# Patient Record
Sex: MALE | Race: WHITE | Employment: PART TIME | URBAN - METROPOLITAN AREA
[De-identification: names, ages, dates, MRNs, and addresses within clinical notes are randomized per-mention and may not be internally consistent; named-entity substitution may affect disease eponyms.]

---

## 2018-04-03 ENCOUNTER — ANESTHESIA EVENT (OUTPATIENT)
Dept: SURGERY | Age: 78
End: 2018-04-03
Payer: MEDICARE

## 2018-04-04 ENCOUNTER — APPOINTMENT (OUTPATIENT)
Dept: GENERAL RADIOLOGY | Age: 78
End: 2018-04-04
Attending: UROLOGY
Payer: MEDICARE

## 2018-04-04 ENCOUNTER — ANESTHESIA (OUTPATIENT)
Dept: SURGERY | Age: 78
End: 2018-04-04
Payer: MEDICARE

## 2018-04-04 ENCOUNTER — HOSPITAL ENCOUNTER (OUTPATIENT)
Age: 78
Setting detail: OUTPATIENT SURGERY
Discharge: HOME | End: 2018-04-04
Attending: UROLOGY | Admitting: UROLOGY
Payer: MEDICARE

## 2018-04-04 VITALS
SYSTOLIC BLOOD PRESSURE: 116 MMHG | RESPIRATION RATE: 16 BRPM | HEIGHT: 68 IN | HEART RATE: 60 BPM | TEMPERATURE: 97.1 F | DIASTOLIC BLOOD PRESSURE: 70 MMHG | WEIGHT: 183.31 LBS | OXYGEN SATURATION: 92 % | BODY MASS INDEX: 27.78 KG/M2

## 2018-04-04 DIAGNOSIS — C66.2 URETERAL CARCINOMA, LEFT (HCC): Primary | ICD-10-CM

## 2018-04-04 LAB
BUN BLD-MCNC: 33 MG/DL (ref 7–18)
CHLORIDE BLD-SCNC: 102 MMOL/L (ref 100–108)
GLUCOSE BLD STRIP.AUTO-MCNC: 140 MG/DL (ref 70–110)
GLUCOSE BLD STRIP.AUTO-MCNC: 158 MG/DL (ref 74–106)
HCT VFR BLD CALC: 36 % (ref 36–49)
HGB BLD-MCNC: 12.2 G/DL (ref 12–16)
POTASSIUM BLD-SCNC: 4.7 MMOL/L (ref 3.5–5.5)
SODIUM BLD-SCNC: 139 MMOL/L (ref 136–145)

## 2018-04-04 PROCEDURE — 76210000021 HC REC RM PH II 0.5 TO 1 HR: Performed by: UROLOGY

## 2018-04-04 PROCEDURE — 76010000172 HC OR TIME 2.5 TO 3 HR INTENSV-TIER 1: Performed by: UROLOGY

## 2018-04-04 PROCEDURE — 82947 ASSAY GLUCOSE BLOOD QUANT: CPT

## 2018-04-04 PROCEDURE — 74011250637 HC RX REV CODE- 250/637: Performed by: NURSE ANESTHETIST, CERTIFIED REGISTERED

## 2018-04-04 PROCEDURE — 74011250636 HC RX REV CODE- 250/636: Performed by: UROLOGY

## 2018-04-04 PROCEDURE — C1769 GUIDE WIRE: HCPCS | Performed by: UROLOGY

## 2018-04-04 PROCEDURE — 77030032490 HC SLV COMPR SCD KNE COVD -B: Performed by: UROLOGY

## 2018-04-04 PROCEDURE — C1894 INTRO/SHEATH, NON-LASER: HCPCS | Performed by: UROLOGY

## 2018-04-04 PROCEDURE — 88305 TISSUE EXAM BY PATHOLOGIST: CPT | Performed by: UROLOGY

## 2018-04-04 PROCEDURE — C2617 STENT, NON-COR, TEM W/O DEL: HCPCS | Performed by: UROLOGY

## 2018-04-04 PROCEDURE — 74011000250 HC RX REV CODE- 250

## 2018-04-04 PROCEDURE — 82962 GLUCOSE BLOOD TEST: CPT

## 2018-04-04 PROCEDURE — 74011250636 HC RX REV CODE- 250/636: Performed by: NURSE ANESTHETIST, CERTIFIED REGISTERED

## 2018-04-04 PROCEDURE — 74011250636 HC RX REV CODE- 250/636

## 2018-04-04 PROCEDURE — 74011000258 HC RX REV CODE- 258

## 2018-04-04 PROCEDURE — 74420 UROGRAPHY RTRGR +-KUB: CPT

## 2018-04-04 PROCEDURE — C1758 CATHETER, URETERAL: HCPCS | Performed by: UROLOGY

## 2018-04-04 PROCEDURE — 77030025764 HC FCPS BIOP ENDOSC BSC -D: Performed by: UROLOGY

## 2018-04-04 PROCEDURE — 93005 ELECTROCARDIOGRAM TRACING: CPT

## 2018-04-04 PROCEDURE — 77030018823 HC SLV COMPR VENO -B: Performed by: UROLOGY

## 2018-04-04 PROCEDURE — 77030005515 HC CATH URETH FOL14 BARD -B: Performed by: UROLOGY

## 2018-04-04 PROCEDURE — 77030018836 HC SOL IRR NACL ICUM -A: Performed by: UROLOGY

## 2018-04-04 PROCEDURE — 88112 CYTOPATH CELL ENHANCE TECH: CPT | Performed by: UROLOGY

## 2018-04-04 PROCEDURE — 77030013079 HC BLNKT BAIR HGGR 3M -A: Performed by: ANESTHESIOLOGY

## 2018-04-04 PROCEDURE — 74011636320 HC RX REV CODE- 636/320: Performed by: UROLOGY

## 2018-04-04 PROCEDURE — 76210000006 HC OR PH I REC 0.5 TO 1 HR: Performed by: UROLOGY

## 2018-04-04 PROCEDURE — 77030018846 HC SOL IRR STRL H20 ICUM -A: Performed by: UROLOGY

## 2018-04-04 PROCEDURE — 77030008683 HC TU ET CUF COVD -A: Performed by: ANESTHESIOLOGY

## 2018-04-04 PROCEDURE — 77030006974 HC BSKT URET RTVR BSC -C: Performed by: UROLOGY

## 2018-04-04 PROCEDURE — 74011636637 HC RX REV CODE- 636/637: Performed by: NURSE ANESTHETIST, CERTIFIED REGISTERED

## 2018-04-04 PROCEDURE — 76060000036 HC ANESTHESIA 2.5 TO 3 HR: Performed by: UROLOGY

## 2018-04-04 DEVICE — URETERAL STENT
Type: IMPLANTABLE DEVICE | Site: URETER | Status: FUNCTIONAL
Brand: POLARIS™ ULTRA

## 2018-04-04 RX ORDER — VECURONIUM BROMIDE FOR INJECTION 1 MG/ML
INJECTION, POWDER, LYOPHILIZED, FOR SOLUTION INTRAVENOUS AS NEEDED
Status: DISCONTINUED | OUTPATIENT
Start: 2018-04-04 | End: 2018-04-04 | Stop reason: HOSPADM

## 2018-04-04 RX ORDER — GLYCOPYRROLATE 0.2 MG/ML
INJECTION INTRAMUSCULAR; INTRAVENOUS AS NEEDED
Status: DISCONTINUED | OUTPATIENT
Start: 2018-04-04 | End: 2018-04-04 | Stop reason: HOSPADM

## 2018-04-04 RX ORDER — OXYCODONE AND ACETAMINOPHEN 5; 325 MG/1; MG/1
1-2 TABLET ORAL ONCE
Status: COMPLETED | OUTPATIENT
Start: 2018-04-04 | End: 2018-04-04

## 2018-04-04 RX ORDER — CEFAZOLIN SODIUM 2 G/50ML
2 SOLUTION INTRAVENOUS
Status: COMPLETED | OUTPATIENT
Start: 2018-04-04 | End: 2018-04-04

## 2018-04-04 RX ORDER — CIPROFLOXACIN 500 MG/1
500 TABLET ORAL 2 TIMES DAILY
Qty: 6 TAB | Refills: 0 | Status: SHIPPED | OUTPATIENT
Start: 2018-04-04 | End: 2018-04-07

## 2018-04-04 RX ORDER — FENTANYL CITRATE 50 UG/ML
50 INJECTION, SOLUTION INTRAMUSCULAR; INTRAVENOUS
Status: DISCONTINUED | OUTPATIENT
Start: 2018-04-04 | End: 2018-04-04 | Stop reason: HOSPADM

## 2018-04-04 RX ORDER — INSULIN LISPRO 100 [IU]/ML
INJECTION, SOLUTION INTRAVENOUS; SUBCUTANEOUS AS NEEDED
Status: DISCONTINUED | OUTPATIENT
Start: 2018-04-04 | End: 2018-04-04 | Stop reason: HOSPADM

## 2018-04-04 RX ORDER — MIDAZOLAM HYDROCHLORIDE 1 MG/ML
INJECTION, SOLUTION INTRAMUSCULAR; INTRAVENOUS AS NEEDED
Status: DISCONTINUED | OUTPATIENT
Start: 2018-04-04 | End: 2018-04-04 | Stop reason: HOSPADM

## 2018-04-04 RX ORDER — ONDANSETRON 2 MG/ML
4 INJECTION INTRAMUSCULAR; INTRAVENOUS ONCE
Status: DISCONTINUED | OUTPATIENT
Start: 2018-04-04 | End: 2018-04-04 | Stop reason: HOSPADM

## 2018-04-04 RX ORDER — SODIUM CHLORIDE, SODIUM LACTATE, POTASSIUM CHLORIDE, CALCIUM CHLORIDE 600; 310; 30; 20 MG/100ML; MG/100ML; MG/100ML; MG/100ML
25 INJECTION, SOLUTION INTRAVENOUS CONTINUOUS
Status: DISCONTINUED | OUTPATIENT
Start: 2018-04-04 | End: 2018-04-04 | Stop reason: HOSPADM

## 2018-04-04 RX ORDER — OXYBUTYNIN CHLORIDE 5 MG/1
5 TABLET ORAL
Qty: 30 TAB | Refills: 0 | Status: SHIPPED | OUTPATIENT
Start: 2018-04-04 | End: 2018-04-18

## 2018-04-04 RX ORDER — DIPHENHYDRAMINE HYDROCHLORIDE 50 MG/ML
12.5 INJECTION, SOLUTION INTRAMUSCULAR; INTRAVENOUS ONCE
Status: DISCONTINUED | OUTPATIENT
Start: 2018-04-04 | End: 2018-04-04 | Stop reason: HOSPADM

## 2018-04-04 RX ORDER — SODIUM CHLORIDE 0.9 % (FLUSH) 0.9 %
5-10 SYRINGE (ML) INJECTION AS NEEDED
Status: DISCONTINUED | OUTPATIENT
Start: 2018-04-04 | End: 2018-04-04 | Stop reason: HOSPADM

## 2018-04-04 RX ORDER — PROPOFOL 10 MG/ML
INJECTION, EMULSION INTRAVENOUS AS NEEDED
Status: DISCONTINUED | OUTPATIENT
Start: 2018-04-04 | End: 2018-04-04 | Stop reason: HOSPADM

## 2018-04-04 RX ORDER — SUCCINYLCHOLINE CHLORIDE 20 MG/ML
INJECTION INTRAMUSCULAR; INTRAVENOUS AS NEEDED
Status: DISCONTINUED | OUTPATIENT
Start: 2018-04-04 | End: 2018-04-04 | Stop reason: HOSPADM

## 2018-04-04 RX ORDER — ONDANSETRON 2 MG/ML
INJECTION INTRAMUSCULAR; INTRAVENOUS AS NEEDED
Status: DISCONTINUED | OUTPATIENT
Start: 2018-04-04 | End: 2018-04-04 | Stop reason: HOSPADM

## 2018-04-04 RX ORDER — INSULIN LISPRO 100 [IU]/ML
INJECTION, SOLUTION INTRAVENOUS; SUBCUTANEOUS ONCE
Status: DISCONTINUED | OUTPATIENT
Start: 2018-04-04 | End: 2018-04-04 | Stop reason: HOSPADM

## 2018-04-04 RX ORDER — FAMOTIDINE 20 MG/1
20 TABLET, FILM COATED ORAL ONCE
Status: COMPLETED | OUTPATIENT
Start: 2018-04-04 | End: 2018-04-04

## 2018-04-04 RX ORDER — DEXTROSE MONOHYDRATE 25 G/50ML
25-50 INJECTION, SOLUTION INTRAVENOUS AS NEEDED
Status: DISCONTINUED | OUTPATIENT
Start: 2018-04-04 | End: 2018-04-04 | Stop reason: HOSPADM

## 2018-04-04 RX ORDER — FENTANYL CITRATE 50 UG/ML
INJECTION, SOLUTION INTRAMUSCULAR; INTRAVENOUS AS NEEDED
Status: DISCONTINUED | OUTPATIENT
Start: 2018-04-04 | End: 2018-04-04 | Stop reason: HOSPADM

## 2018-04-04 RX ORDER — MAGNESIUM SULFATE 100 %
4 CRYSTALS MISCELLANEOUS AS NEEDED
Status: DISCONTINUED | OUTPATIENT
Start: 2018-04-04 | End: 2018-04-04 | Stop reason: HOSPADM

## 2018-04-04 RX ORDER — LIDOCAINE HYDROCHLORIDE 20 MG/ML
INJECTION, SOLUTION EPIDURAL; INFILTRATION; INTRACAUDAL; PERINEURAL AS NEEDED
Status: DISCONTINUED | OUTPATIENT
Start: 2018-04-04 | End: 2018-04-04 | Stop reason: HOSPADM

## 2018-04-04 RX ORDER — NEOSTIGMINE METHYLSULFATE 5 MG/5 ML
SYRINGE (ML) INTRAVENOUS AS NEEDED
Status: DISCONTINUED | OUTPATIENT
Start: 2018-04-04 | End: 2018-04-04 | Stop reason: HOSPADM

## 2018-04-04 RX ORDER — OXYCODONE AND ACETAMINOPHEN 5; 325 MG/1; MG/1
1 TABLET ORAL
Qty: 30 TAB | Refills: 0 | Status: SHIPPED | OUTPATIENT
Start: 2018-04-04 | End: 2018-04-18

## 2018-04-04 RX ORDER — MORPHINE SULFATE 2 MG/ML
2-4 INJECTION, SOLUTION INTRAMUSCULAR; INTRAVENOUS ONCE
Status: DISCONTINUED | OUTPATIENT
Start: 2018-04-04 | End: 2018-04-04 | Stop reason: HOSPADM

## 2018-04-04 RX ADMIN — FENTANYL CITRATE 50 MCG: 50 INJECTION, SOLUTION INTRAMUSCULAR; INTRAVENOUS at 12:17

## 2018-04-04 RX ADMIN — FAMOTIDINE 20 MG: 20 TABLET, FILM COATED ORAL at 11:30

## 2018-04-04 RX ADMIN — GLYCOPYRROLATE 0.6 MG: 0.2 INJECTION INTRAMUSCULAR; INTRAVENOUS at 14:47

## 2018-04-04 RX ADMIN — SUCCINYLCHOLINE CHLORIDE 100 MG: 20 INJECTION INTRAMUSCULAR; INTRAVENOUS at 12:19

## 2018-04-04 RX ADMIN — VECURONIUM BROMIDE FOR INJECTION 2 MG: 1 INJECTION, POWDER, LYOPHILIZED, FOR SOLUTION INTRAVENOUS at 13:44

## 2018-04-04 RX ADMIN — ONDANSETRON 4 MG: 2 INJECTION INTRAMUSCULAR; INTRAVENOUS at 14:20

## 2018-04-04 RX ADMIN — VECURONIUM BROMIDE FOR INJECTION 1 MG: 1 INJECTION, POWDER, LYOPHILIZED, FOR SOLUTION INTRAVENOUS at 14:07

## 2018-04-04 RX ADMIN — PROPOFOL 150 MG: 10 INJECTION, EMULSION INTRAVENOUS at 12:19

## 2018-04-04 RX ADMIN — LIDOCAINE HYDROCHLORIDE 50 MG: 20 INJECTION, SOLUTION EPIDURAL; INFILTRATION; INTRACAUDAL; PERINEURAL at 12:19

## 2018-04-04 RX ADMIN — SODIUM CHLORIDE, SODIUM LACTATE, POTASSIUM CHLORIDE, AND CALCIUM CHLORIDE: 600; 310; 30; 20 INJECTION, SOLUTION INTRAVENOUS at 13:30

## 2018-04-04 RX ADMIN — CEFAZOLIN SODIUM 2 G: 2 SOLUTION INTRAVENOUS at 12:24

## 2018-04-04 RX ADMIN — OXYCODONE HYDROCHLORIDE AND ACETAMINOPHEN 2 TABLET: 5; 325 TABLET ORAL at 15:30

## 2018-04-04 RX ADMIN — Medication 4 MG: at 14:46

## 2018-04-04 RX ADMIN — VECURONIUM BROMIDE FOR INJECTION 2 MG: 1 INJECTION, POWDER, LYOPHILIZED, FOR SOLUTION INTRAVENOUS at 12:44

## 2018-04-04 RX ADMIN — VECURONIUM BROMIDE FOR INJECTION 2 MG: 1 INJECTION, POWDER, LYOPHILIZED, FOR SOLUTION INTRAVENOUS at 12:24

## 2018-04-04 RX ADMIN — FENTANYL CITRATE 25 MCG: 50 INJECTION, SOLUTION INTRAMUSCULAR; INTRAVENOUS at 12:53

## 2018-04-04 RX ADMIN — SODIUM CHLORIDE, SODIUM LACTATE, POTASSIUM CHLORIDE, AND CALCIUM CHLORIDE 25 ML/HR: 600; 310; 30; 20 INJECTION, SOLUTION INTRAVENOUS at 11:40

## 2018-04-04 RX ADMIN — MIDAZOLAM HYDROCHLORIDE 2 MG: 1 INJECTION, SOLUTION INTRAMUSCULAR; INTRAVENOUS at 12:05

## 2018-04-04 RX ADMIN — INSULIN LISPRO 2 UNITS: 100 INJECTION, SOLUTION INTRAVENOUS; SUBCUTANEOUS at 11:47

## 2018-04-04 NOTE — H&P
Ching Bias   1940  68 y.o.           Encounter Diagnoses       ICD-10-CM ICD-9-CM   1. Malignant neoplasm of ureter, unspecified laterality (HCC) C66.9 189.2   2. Malignant neoplasm of urinary bladder, unspecified site (HCC) C67.9 188.9   3. Hematuria, unspecified type R31.9 599.70         UROLOGY NEW CONSULT     Assessment:  1. Patient is a 68 y.o. 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 S/p cysto, bilateral retrogrades, left JJ stent placement, multifocal transurethral resection of bladder tumor and fulguration, and intravesical mitomycin                chemotherapy immediately postop on 18 by Dr. Jo Ann Avilez. S/p cysto, left UTS and biopsy of left ureteral tumor 2018 with low grade UC of left ureter     Recent Imaging: CTU 17 showed thickened bladder wall particularly on the right side with some lobulation; this may be due to cystitis but infiltrating neoplastic process is not excluded; focal filling defect in the bladder base that may be an extrinsic mass effect, clot, or intraluminal neoplasm; marked left hydronephrosis and suggestion of wall thickening in the proximal left ureter, this may be due to infection or neoplastic process. Last Creatinine: 1.3 on 17   Preop Creatinine: 1.3 on 17  Last Cystoscopy: 18 at time of URS/biopsy, the bladder itself shows evidence of the old tumor site fulguration with no sign of any recurrent disease.      First Tumor: 18  TURBT History: 18 positive for LG pTa papillary UCB. [facility]vesicle [facility] immediately post-op     Current Disease Status:  Newly diagnosed. Current Treatment Plan: [facility] or gemcitabine instillations and interval cystoscopies.     2. 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                          CTU 17 showed marked left hydronephrosis and suggestion of wall thickening in the proximal left ureter, this may be due to infection or neoplastic process. S/p cystoscopy, left retrograde ureteroscopy, biopsy of ureteral lesion, fulguration with left JJ stent removal and replacement on 1/29/18 by Dr. 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       3. BPH with friable median lobe noted on cysto 1/8/18.      4. PMHx significant for: CAD, AAA, COPD, hiatal hernia, GERD, hypertension, dyslipidemia, DM2, SVT, atrial fibrillation, pacemaker, history of skin cancers on the face, CKD post partial left nephrectomy, thoracic aortic aneurysm, being monitored, DJD, carotid endarterectomies for carotid arterial disease, chronic back pain, peripheral arterial disease, lumbar spondylosis, and history of supraspinatus tendon issues.      5. PSHx significant for pacemaker, carotid endarterectomy, left partial nephrectomy elsewhere, AAA repair through an endovascular type graft, and angioplasty and stent for coronary artery disease.      Plan:  · Reviewed pathology reports with patient in detail; LG urothelial carcinoma of the left ureter and bladder. · Will send pathology for second opinion to confirm LG disease. · Discussed laser ablation of left ureteral tumor if pathology is indeed LG and will re-biopsy left ureteral tumor at that time. · If able to successfully ablate ureteral tumor, plan for monitoring with imaging and interval ureteroscopies. · Discussed interval cystoscopies and [facility] or gemcitabine instillations for management of his bladder cancer. · Recommend leaving left ureteral stent in place at this time. · Remain off Plavix for at least 5 days prior to procedure, he may continue to take ASA 81 prior to surgery. He recently received clearance for recent surgeries and has been able to safely hold Plavix in January. · Urine sent for culture and cytology today.   · Plan for left URS with laser ablation of ureteral tumor and possible biopsy.      Discussion:  I discussed at length the diagnosis and management of bladder cancer. We discussed that the causes of bladder cancer are numerous and primarily involve smoking and other chemicals. We discussed the options of observation with serial cystoscopy and intravesical treatment with medication such as intravesical MMC or gemcitabine. We discussed risks of progression as well as recurrence and natural history of bladder cancer.         Chief Complaint   Patient presents with    Referral / Consult       Ureter cancer         Subjective:  Juan Meredith is a 68 y.o. WHITE OR  male who is referred by Dr. Debora Espinoza for evaluation and treatment of LG urothelial carcinoma of the bladder and left ureter.     CTU 11/21/17 (performed for evaluation of gross hematuria x2 weeks) showed thickened bladder wall particularly on the right side with some lobulation; this may be due to cystitis but infiltrating neoplastic process is not excluded; focal filling defect in the bladder base that may be an extrinsic mass effect, clot, or intraluminal neoplasm; marked left hydronephrosis and suggestion of wall thickening in the proximal left ureter, this may be due to infection or neoplastic process.     Patient found to have multifocal bladder tumors on office cysto (~12/2017) with finding of left hydronephrosis due to ureteral filling defect on retrograde (1/8/18), tumors located on the right posterior wall, mid, and high posterior wall, as well as right bladder neck.     S/p cysto, bilateral retrogrades, left JJ stent placement, multifocal transurethral resection of bladder tumor and fulguration, and intravesical mitomycin chemotherapy immediately postop on 1/8/18 by Dr. Debora Espinoza.   Pathology revealed LG pTa papillary UCB.     Ureteral stent was placed to decompress the ureter and allow the ureter to soften to allow ureteroscopic examination (at interval) of the strictured area and possible biopsy. Per Dr. Brianna Russo, because they had not permitted or discussed ureteroscopy, they postponed dealing with the left ureter filling defect issue until he healed up a little bit from his tumors.     S/p cystoscopy, left retrograde ureteroscopy, biopsy of ureteral lesion, fulguration with left JJ stent removal and replacement on 1/29/18 by Dr. Brianna Russo. 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     Patient notes that he was able to stop his Plavix and ASA 81 on 1/1/18 prior to his surgery on 1/8/18. He resumed taking only ASA 81 on 2/19/18. He denies any gross hematuria since his surgeries. He denies any bothersome voiding complaints at this time and is willing to proceed with laser ablation of his left ureteral tumor.      63 pack year history of smoking, quit early 1/2018.           Past Medical History:   Diagnosis Date    Asbestosis [facility])      Bladder [facility] [address]      Diabetes ([facility] [address]      Emphysema/COPD ([facility] [address]      Hypercholesterolemia      Hypertension              Past Surgical History:   Procedure Laterality Date    HX OTHER SURGICAL   03/13/2014     B/L Lens imnplants, [place]    HX OTHER SURGICAL         Triple AAA Abdominal Aotiic Anyusism (Abdominal Stent)     HX OTHER SURGICAL   01/2018     Bladder Cancer Surgery (stent placed)    HX OTHER SURGICAL   01/29/2018     Replaced Stent from Earlier Bladder Cancer Surgery    HX PACEMAKER   2014     Jeanneheidy Fatoumata [facility]      No family history on file.   Social History            Social History    Marital status:        Spouse name: N/A    Number of children: N/A    Years of education: N/A          Occupational History    Not on file.            Social History Main Topics    Smoking status: Former Smoker       Packs/day: 1.00       Years: 60.00       Quit date: 12/2017    Smokeless tobacco: Never Used    Alcohol use No    Drug use: Not on file    Sexual activity: Not on file         Other Topics Concern    Not on file          Social History Narrative    No narrative on file      Current Outpatient Prescriptions   Medication Sig    atorvastatin (LIPITOR) 80 mg tablet      carvedilol (COREG) 6.25 mg tablet      lisinopril-hydroCHLOROthiazide (PRINZIDE, ZESTORETIC) 20-12.5 mg per tablet      levocetirizine (XYZAL) 5 mg tablet      JANUVIA 100 mg tablet      pantoprazole (PROTONIX) 40 mg tablet      clopidogrel (PLAVIX) 75 mg tab Take  by mouth.  aspirin delayed-release 81 mg tablet Take  by mouth daily.  nitroglycerin (NITROSTAT) 0.4 mg SL tablet by SubLINGual route every five (5) minutes as needed for Chest Pain.  ALBUTEROL SULFATE (PROVENTIL IN) Take  by inhalation.      No current facility-administered medications for this visit.       No Known Allergies     Review of Systems  Constitutional: Fever: No  Skin: Rash: No  HEENT: Hearing difficulty: No  Eyes: Blurred vision: No  Cardiovascular: Chest pain: No  Respiratory: Shortness of breath: No  Gastrointestinal: Nausea/vomiting: No  Musculoskeletal: Back pain: No  Neurological: Weakness: No  Psychological: Memory loss: No  Comments/additional findings:         PHYSICAL EXAM:       Visit Vitals    /84    Ht 5' 8.5\" (1.74 m)    Wt 186 lb (84.4 kg)    BMI 27.87 kg/m2      Constitutional: WDWN, Pleasant and appropriate affect, No acute distress. HEENT: EOMs in tact  Respiratory: No respiratory distress or difficulties  CV:  No peripheral swelling noted  Abdomen:  No abdominal masses or tenderness. Skin: Normal color and texture and No rashes or erythema noted  Neuro/Psych:  Alert and Oriented x3, affect appropriate.    EXT: no cyanosis or edema       Labs        Results for orders placed or performed in visit on 03/01/18   AMB POC URINALYSIS DIP STICK AUTO W/O MICRO   Result Value Ref Range     Color (UA POC) Yellow       Clarity (UA POC) Clear       Glucose (UA POC) Negative Negative     Bilirubin (UA POC) Negative Negative     Ketones (UA POC) Negative Negative     Specific gravity (UA POC) 1.020 1.001 - 1.035     Blood (UA POC) 2+ Negative     pH (UA POC) 7.0 4.6 - 8.0     Protein (UA POC) 1+ Negative     Urobilinogen (UA POC) 0.2 mg/dL 0.2 - 1     Nitrites (UA POC) Negative Negative     Leukocyte esterase (UA POC) 1+ Negative      Surgical Pathology 1/29/18  Diagnosis  A. LEFT URETER, BIOPSIES:    - NON-INVASIVE LOW-GRADE PAPILLARY UROTHELIAL CARCINOMA   Comment:   No muscularis propria is included in the specimen.     Surgical Pathology 1/8/18  Diagnosis  A. BLADDER, TRIGONE, TURBT:  (CANCER CASE SUMMARY FOR BLADDER CANCER)  - PROCEDURE: TURBT  - TUMOR SITE: LEFT TRIGONE  - HISTOLOGIC TYPE: PAPILLARY UROTHELIAL CARCINOMA, NONINVASIVE  - HISTOLOGIC GRADE: LOW-GRADE  - MUSCULARIS PROPRIA PRESENCE: NO MUSCULARIS PROPRIA IDENTIFIED  - LYMPHOVASCULAR INVASION: NOT IDENTIFIED  - TUMOR EXTENSION: NONINVASIVE PAPILLARY CARCINOMA  B. BLADDER, RIGHT POSTERIOR WALL, TURBT:  (CANCER CASE SUMMARY FOR BLADDER CANCER)  - PROCEDURE: TURBT  - TUMOR SITE: RIGHT POSTERIOR WALL  - HISTOLOGIC TYPE: PAPILLARY UROTHELIAL CARCINOMA, NONINVASIVE  - HISTOLOGIC GRADE: LOW-GRADE  - MUSCULARIS PROPRIA PRESENCE: NO MUSCULARIS PROPRIA IDENTIFIED  - LYMPHOVASCULAR INVASION: NOT IDENTIFIED  - TUMOR EXTENSION: NONINVASIVE PAPILLARY CARCINOMA  C. BLADDER, RIGHT BLADDER NECK, TURBT:  (CANCER CASE SUMMARY FOR BLADDER CANCER)  - PROCEDURE: TURBT  - TUMOR SITE: RIGHT BLADDER NECK  - HISTOLOGIC TYPE: PAPILLARY UROTHELIAL CARCINOMA, NONINVASIVE  - HISTOLOGIC GRADE: LOW-GRADE  - MUSCULARIS PROPRIA PRESENCE: CANNOT BE DETERMINED; SEE COMMENT  - LYMPHOVASCULAR INVASION: NOT IDENTIFIED  - TUMOR EXTENSION: NONINVASIVE PAPILLARY CARCINOMA  Comment:  C. A very limited amount of smooth muscle is present and is not involved by tumor.  It is difficult to be certain whether this represents muscularis propria or hypertrophied muscularis mucosa.        Radiology:  RPG 1/29/18  IMPRESSION:  1. Left hydronephrosis with poor opacification the left ureter. Question left ureteropelvic stenosis. 2. Left ureteral stent placement. RPG 1/8/18  IMPRESSION:  1. Multiple filling defects in the proximal left ureter with marked left hydronephrosis and proximal hydroureter  2. Left ureteral stent was placed in standard position  3. Normal right retrograde pyelogram     CT Urogram W/WO Contrast 11/21/17: images reviewed today. --Heart and Lung Bases: The heart size is within normal limits.  There is a 6 mm pleural-based nodule in the left lower lobe. There are prominent reticular markings in both lung bases. There is minimal right pleural thickening. --Kidneys:Normal size, shape, and position. Haig Molt is marked left hydronephrosis and no evidence of hydroureter. There are multiple simple right renal cysts. There are 2 hypodense cortical lesions in the lower pole of the left kidney that are too small to characterize. The largest measures 8 mm in length. There is also a 5 mm hypodense cortical lesion in the midpole the left kidney. . There is minimal opacification of the ureters. No abnormal filling defect is evident in the renal collecting system bilaterally. --No lymphadenopathy. No inflammatory changes are seen in the abdomen. No abdominal aortic aneurysm. No anterior abdominal wall hernia. No free fluid. There is no bowel dilatation. Haig Molt is no evidence of free air. There is an aortoiliac stent graft noted. No contrast extravasation is evident. --There is thickening and some macrolobulation of the bladder wall along the right margin there is a focal defect in the right bladder base that may be extrinsic mass effect but an intraluminal nodule is not excluded. Measures 11 mm in diameter and is evident on axial image 73 of the delayed data set.  The appendix is normal in caliber.  No pelvic inflammation or lymphadenopathy. No inguinal hernia. No free fluid. There are no focal suspicious lytic or sclerotic osseous lesions. IMPRESSION:  1. Thickened bladder wall particularly on the right side with some lobulation. This may be due to cystitis but infiltrating neoplastic process is not excluded. 2. Focal filling defect in the bladder base that may be an extrinsic mass effect, clot, or intraluminal neoplasm. 3. Marked left hydronephrosis and suggestion of wall thickening in the proximal left ureter. This may be due to infection or neoplastic process. 4. Multiple simple cysts are seen in the right kidney. There are also hypodense cortical lesions in the left kidney that are likely cysts. 5. Indeterminate 5 mm hypodense lesion in the liver. This is likely a cyst.  6. 6 mm pleural-based nodule in the left lower lobe. I would recommend a follow-up chest CT with contrast in 6 months to reevaluate this finding.        Christophe Dennis MD  , Dept of Urology  [facility]  [facility] [place], [address]  Pager #: [phone]        This note has been sent to the referring physician, with findings and recommendations.   CC: Senait Gardner MD

## 2018-04-04 NOTE — DISCHARGE INSTRUCTIONS
Cystoscopy: What to Expect at [address] [facility]    A cystoscopy is a procedure that lets a doctor look inside of the bladder and the urethra. The urethra is the tube that carries urine from the bladder to outside the body. The doctor uses a thin, lighted tool called a cystoscope. Your bladder is filled with fluid. This stretches the bladder so that your doctor can look closely at the inside of your bladder. After the cystoscopy, your urethra may be sore at first, and it may burn when you urinate for the first few days after the procedure. You may feel the need to urinate more often, and your urine may be pink. These symptoms should get better in 1 or 2 days. You will probably be able to go back to most of your usual activities in 1 or 2 days. This care sheet gives you a general idea about how long it will take for you to recover. But each person recovers at a different pace. Follow the steps below to get better as quickly as possible. How can you care for yourself at home? Activity  ? · Rest when you feel tired. Getting enough sleep will help you recover. ? · Try to walk each day. Start by walking a little more than you did the day before. Bit by bit, increase the amount you walk. Walking boosts blood flow and helps prevent pneumonia and constipation. ? · Avoid strenuous activities, such as bicycle riding, jogging, weight lifting, or aerobic exercise, until your doctor says it is okay. ? · Ask your doctor when you can drive again. ? · Most people are able to return to work within 1 or 2 days after the procedure. ? · You may shower and take baths as usual.   ? · Ask your doctor when it is okay for you to have sex. Diet  ? · You can eat your normal diet. If your stomach is upset, try bland, low-fat foods like plain rice, broiled chicken, toast, and yogurt. ? · Drink plenty of fluids (unless your doctor tells you not to). Medicines  ? · Take pain medicines exactly as directed.   ¨ If the doctor gave you a prescription medicine for pain, take it as prescribed. ¨ If you are not taking a prescription pain medicine, ask your doctor if you can take an over-the-counter medicine. ? · If you think your pain medicine is making you sick to your stomach:  ¨ Take your medicine after meals (unless your doctor has told you not to). ¨ Ask your doctor for a different pain medicine. ? · If your doctor prescribed antibiotics, take them as directed. Do not stop taking them just because you feel better. You need to take the full course of antibiotics. Follow-up care is a key part of your treatment and safety. Be sure to make and go to all appointments, and call your doctor if you are having problems. It's also a good idea to know your test results and keep a list of the medicines you take. When should you call for help? Call 911 anytime you think you may need emergency care. For example, call if:  ? · You passed out (lost consciousness). ? · You have severe trouble breathing. ? · You have sudden chest pain and shortness of breath, or you cough up blood. ? · You have severe belly pain. ?Call your doctor now or seek immediate medical care if:  ? · You are sick to your stomach or cannot keep fluids down. ? · Your urine is still red or you see blood clots after you have urinated several times. ? · You have trouble passing urine or stool, especially if you have pain or swelling in your lower belly. ? · You have signs of a blood clot, such as:  ¨ Pain in your calf, back of the knee, thigh, or groin. ¨ Redness and swelling in your leg or groin. ? · You develop a fever or severe chills. ? · You have pain in your back just below your rib cage. This is called flank pain. ? Watch closely for changes in your health, and be sure to contact your doctor if:  ? · You have pain or burning when you urinate. A burning feeling is normal for a day or two after the test, but call if it does not get better. ? · You have a frequent urge to urinate but can pass only small amounts of urine. ? · Your urine is pink, red, or cloudy, or smells bad. It is normal for the urine to have a pinkish color for a few days after the test, but call if it does not get better. Where can you learn more? Go to http://sarah-hugh.info/. Enter W213 in the search box to learn more about \"Cystoscopy: What to Expect at Home. \"  Current as of: May 12, 2017  Content Version: 11.4  © 1813-1731 [employer]. Care instructions adapted under license by [employer] (which disclaims liability or warranty for this information). If you have questions about a medical condition or this instruction, always ask your healthcare professional. [place] [address] any warranty or liability for your use of this information. DISCHARGE SUMMARY from Nurse    PATIENT INSTRUCTIONS:    After general anesthesia or intravenous sedation, for 24 hours or while taking prescription Narcotics:  · Limit your activities  · Do not drive and operate hazardous machinery  · Do not make important personal or business decisions  · Do  not drink alcoholic beverages  · If you have not urinated within 8 hours after discharge, please contact your surgeon on call. Report the following to your surgeon:  · Excessive pain, swelling, redness or odor of or around the surgical area  · Temperature over 100.5  · Nausea and vomiting lasting longer than 4 hours or if unable to take medications  · Any signs of decreased circulation or nerve impairment to extremity: change in color, persistent  numbness, tingling, coldness or increase pain  · Any questions    What to do at Home:    These are general instructions for a healthy lifestyle:    No smoking/ No tobacco products/ Avoid exposure to second hand smoke  Surgeon General's Warning:  Quitting smoking now greatly reduces serious risk to your health.     Obesity, smoking, and sedentary lifestyle greatly increases your risk for illness    A healthy diet, regular physical exercise & weight monitoring are important for maintaining a healthy lifestyle    You may be retaining fluid if you have a history of heart failure or if you experience any of the following symptoms:  Weight gain of 3 pounds or more overnight or 5 pounds in a week, increased swelling in our hands or feet or shortness of breath while lying flat in bed. Please call your doctor as soon as you notice any of these symptoms; do not wait until your next office visit. Recognize signs and symptoms of STROKE:    F-face looks uneven    A-arms unable to move or move unevenly    S-speech slurred or non-existent    T-time-call 911 as soon as signs and symptoms begin-DO NOT go       Back to bed or wait to see if you get better-TIME IS BRAIN. Warning Signs of HEART ATTACK     Call 911 if you have these symptoms:   Chest discomfort. Most heart attacks involve discomfort in the center of the chest that lasts more than a few minutes, or that goes away and comes back. It can feel like uncomfortable pressure, squeezing, fullness, or pain.  Discomfort in other areas of the upper body. Symptoms can include pain or discomfort in one or both arms, the back, neck, jaw, or stomach.  Shortness of breath with or without chest discomfort.  Other signs may include breaking out in a cold sweat, nausea, or lightheadedness. Don't wait more than five minutes to call 911 - MINUTES MATTER! Fast action can save your life. Calling 911 is almost always the fastest way to get lifesaving treatment. Emergency Medical Services staff can begin treatment when they arrive -- up to an hour sooner than if someone gets to the hospital by car. The discharge information has been reviewed with the patient. The patient verbalized understanding.   Discharge medications reviewed with the patient and appropriate educational materials and side effects teaching were provided. ___________________________________________________________________________________________________________________________________  Patient armband removed and given to patient to take home.   Patient was informed of the privacy risks if armband lost or stolen

## 2018-04-04 NOTE — BRIEF OP NOTE
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    Procedure(s):  CYSTOSCOPY, LEFT URETEROSCOPY WITH BIOPSY, LASER ABLATION OF TUMOR LEFT URETERAL STENT EXCHANGE BILATERAL RETROGRADES, BLADDER BIOPSY AND FULGURATION  Surgeon(s) and Role:     * Sofya Pittman MD - Primary         Assistant Staff: None      Surgical Staff:  Circ-1: Myrna Berger RN  Radiology Technician: Flor Wallis  Scrub Tech-1: Zechariah Rodriguez  Event Time In   Incision Start 12:30 PM   Incision Close  2:40 PM     Anesthesia: General   Estimated Blood Loss: minimal  Specimens:   ID Type Source Tests Collected by Time Destination   1 : LEFT URETRAL TUMOR BIOPSY Preservative URETER, LEFT  Sofya Pittman MD 4/4/2018  1:01 PM Pathology   2 : Barrington Fischer MD 4/4/2018  2:22 PM Pathology   1 : LEFT URETERAL URINE FOR CYTOLOGY  URETER, LEFT  Sofya Pittman MD 4/4/2018 12:59 PM Cytology      Findings: large pendunculated left proximal ureteral tumor, one small erythematous area in bladder near dome and apparent prior biopsy site   Complications: none  Implants:   Implant Name Type Inv.  Item Serial No.  Lot No. LRB No. Used Lauren Blanc DBL-PGTL [id number] Mac [employer]anc DBL-PGTL [id number] -- Maninder Alcantara [facility] [id number] Left 1 Implanted         Bonnie Castillo MD  , Dept of Urology  [facility] [place] [place]  Pager #: [phone]

## 2018-04-04 NOTE — IP AVS SNAPSHOT
[address] [place] [address] [address] 
[phone] Patient: Lawyer Jones MRN: [id number] GUCCI:[birth year] About your hospitalization You were admitted on:  April 4, 2018 You last received care in the:  [facility] PACU You were discharged on:  April 4, 2018 Why you were hospitalized Your primary diagnosis was:  Not on File Follow-up Information Follow up With Details Comments Contact Info Chris العراقي MD   [address] [address] 
[phone] Discharge Orders Procedure Order Date Status Priority Quantity Spec Type Associated Dx CALL YOUR DOCTOR For: Other Call MD if fever >101.5, signs of infection, intractable pain, nausea, vomiting, significant bleeding, worsening shortness of breath or chest pain, painful leg swelling, or any questions or concerns. 04/04/18 1450 Normal Routine 1  Ureteral carcinoma, left ([address] [[id number]] Comments:  Call MD if fever >101.5, signs of infection, intractable pain, nausea, vomiting, significant bleeding, worsening shortness of breath or chest pain, painful leg swelling, or any questions or concerns. Questions: For:  Other ACTIVITY AFTER DISCHARGE Patient should: Resume activity as tolerated. 04/04/18 1450 Normal Routine 1  Ureteral carcinoma, left ([address] [[id number]] Questions: Patient should:  Resume activity as tolerated. DIET CARDIAC No options chosen 04/04/18 1450 Normal Routine 1  Ureteral carcinoma, left ([address] [[id number]] Questions: Additional options:  No options chosen SCOTT CATHETER, CARE 04/04/18 1450 Normal Routine 1  Ureteral carcinoma, left ([address] [[id number]] Schedule Instructions:  Please provide family with 10 cc syringe and he is to remove scott at home on Friday AM. A check cm indicates which time of day the medication should be taken. My Medications START taking these medications Instructions Each Dose to Equal  
 Morning Noon Evening Bedtime  
 ciprofloxacin HCl 500 mg tablet Commonly known as:  CIPRO Your last dose was: Your next dose is: Take 1 Tab by mouth two (2) times a day for 3 days. 500 mg  
    
   
   
   
  
 oxybutynin 5 mg tablet Commonly known as:  [id number] Your last dose was: Your next dose is: Take 1 Tab by mouth three (3) times daily as needed. Indications: Bladder Hyperactivity, URINARY URGENCY  
 5 mg  
    
   
   
   
  
 oxyCODONE-acetaminophen 5-325 mg per tablet Commonly known as:  PERCOCET Your last dose was: Your next dose is: Take 1 Tab by mouth every four (4) hours as needed for Pain. Max Daily Amount: 6 Tabs. 1 Tab CONTINUE taking these medications Instructions Each Dose to Equal  
 Morning Noon Evening Bedtime  
 aspirin delayed-release 81 mg tablet Your last dose was: Your next dose is: Take  by mouth daily. atorvastatin 80 mg tablet Commonly known as:  LIPITOR Your last dose was: Your next dose is:    
   
   
      
   
   
   
  
 carvedilol 6.25 mg tablet Commonly known as:  Oswaldo Cruz Your last dose was: Your next dose is:    
   
   
 two (2) times a day. clopidogrel 75 mg Tab Commonly known as:  PLAVIX Your last dose was: Your next dose is: Take  by mouth. JANUVIA 100 mg tablet Generic drug:  SITagliptin Your last dose was: Your next dose is:    
   
   
      
   
   
   
  
 levocetirizine 5 mg tablet Commonly known as:  Aliyah Bender Your last dose was: Your next dose is:    
   
   
      
   
   
   
  
 lisinopril-hydroCHLOROthiazide 20-12.5 mg per tablet Commonly known as:  Briana Mckee Your last dose was: Your next dose is:    
   
   
      
   
   
   
  
 nitroglycerin 0.4 mg SL tablet Commonly known as:  NITROSTAT Your last dose was: Your next dose is:    
   
   
 by SubLINGual route every five (5) minutes as needed for Chest Pain.  
     
   
   
   
  
 pantoprazole 40 mg tablet Commonly known as:  PROTONIX Your last dose was: Your next dose is:    
   
   
 daily. PROVENTIL IN Your last dose was: Your next dose is: Take  by inhalation. Where to Get Your Medications Information on where to get these meds will be given to you by the nurse or doctor. ! Ask your nurse or doctor about these medications  
  ciprofloxacin HCl 500 mg tablet  
 oxybutynin 5 mg tablet  
 oxyCODONE-acetaminophen 5-325 mg per tablet Opioid Education Prescription Opioids: What You Need to Know: 
 
Prescription opioids can be used to help relieve moderate-to-severe pain and are often prescribed following a surgery or injury, or for certain health conditions. These medications can be an important part of treatment but also come with serious risks. Opioids are strong pain medicines. Examples include hydrocodone, oxycodone, fentanyl, and morphine. Heroin is an example of an illegal opioid. It is important to work with your health care provider to make sure you are getting the safest, most effective care. WHAT ARE THE RISKS AND SIDE EFFECTS OF OPIOID USE? Prescription opioids carry serious risks of addiction and overdose, especially with prolonged use. An opioid overdose, often marked by slow breathing, can cause sudden death. The use of prescription opioids can have a number of side effects as well, even when taken as directed. · Tolerance-meaning you might need to take more of a medication for the same pain relief · Physical dependence-meaning you have symptoms of withdrawal when the medication is stopped. Withdrawal symptoms can include nausea, sweating, chills, diarrhea, stomach cramps, and muscle aches. Withdrawal can last up to several weeks, depending on which drug you took and how long you took it. · Increased sensitivity to pain · Constipation · Nausea, vomiting, and dry mouth · Sleepiness and dizziness · Confusion · Depression · Low levels of testosterone that can result in lower sex drive, energy, and strength · Itching and sweating RISKS ARE GREATER WITH:      
· History of drug misuse, substance use disorder, or overdose · Mental health conditions (such as depression or anxiety) · Sleep apnea · Older age (72 years or older) · Pregnancy Avoid alcohol while taking prescription opioids. Also, unless specifically advised by your health care provider, medications to avoid include: · Benzodiazepines (such as Xanax or Valium) · Muscle relaxants (such as Soma or Flexeril) · Hypnotics (such as Ambien or Lunesta) · Other prescription opioids KNOW YOUR OPTIONS Talk to your health care provider about ways to manage your pain that don't involve prescription opioids. Some of these options may actually work better and have fewer risks and side effects. Options may include: 
· Pain relievers such as acetaminophen, ibuprofen, and naproxen · Some medications that are also used for depression or seizures · Physical therapy and exercise · Counseling to help patients learn how to cope better with triggers of pain and stress. · Application of heat or cold compress · Massage therapy · Relaxation techniques Be Informed Make sure you know the name of your medication, how much and how often to take it, and its potential risks & side effects. IF YOU ARE PRESCRIBED OPIOIDS FOR PAIN: 
· Never take opioids in greater amounts or more often than prescribed. Remember the goal is not to be pain-free but to manage your pain at a tolerable level. · Follow up with your primary care provider to: · Work together to create a plan on how to manage your pain. · Talk about ways to help manage your pain that don't involve prescription opioids. · Talk about any and all concerns and side effects. · Help prevent misuse and abuse. · Never sell or share prescription opioids · Help prevent misuse and abuse. · Store prescription opioids in a secure place and out of reach of others (this may include visitors, children, friends, and family). · Safely dispose of unused/unwanted prescription opioids: Find your community drug take-back program or your pharmacy mail-back program, or flush them down the toilet, following guidance from the Food and Drug Administration (www.fda.gov/Drugs/ResourcesForYou). · Visit www.cdc.gov/drugoverdose to learn about the risks of opioid abuse and overdose. · If you believe you may be struggling with addiction, tell your health care provider and ask for guidance or call [employer] at 5-416-734-WVKO. Discharge Instructions Cystoscopy: What to Expect at [facility] Your Recovery A cystoscopy is a procedure that lets a doctor look inside of the bladder and the urethra. The urethra is the tube that carries urine from the bladder to outside the body. The doctor uses a thin, lighted tool called a cystoscope. Your bladder is filled with fluid. This stretches the bladder so that your doctor can look closely at the inside of your bladder. After the cystoscopy, your urethra may be sore at first, and it may burn when you urinate for the first few days after the procedure. You may feel the need to urinate more often, and your urine may be pink. These symptoms should get better in 1 or 2 days. You will probably be able to go back to most of your usual activities in 1 or 2 days.  
This care sheet gives you a general idea about how long it will take for you to recover. But each person recovers at a different pace. Follow the steps below to get better as quickly as possible. How can you care for yourself at home? Activity ? · Rest when you feel tired. Getting enough sleep will help you recover. ? · Try to walk each day. Start by walking a little more than you did the day before. Bit by bit, increase the amount you walk. Walking boosts blood flow and helps prevent pneumonia and constipation. ? · Avoid strenuous activities, such as bicycle riding, jogging, weight lifting, or aerobic exercise, until your doctor says it is okay. ? · Ask your doctor when you can drive again. ? · Most people are able to return to work within 1 or 2 days after the procedure. ? · You may shower and take baths as usual.  
? · Ask your doctor when it is okay for you to have sex. Diet ? · You can eat your normal diet. If your stomach is upset, try bland, low-fat foods like plain rice, broiled chicken, toast, and yogurt. ? · Drink plenty of fluids (unless your doctor tells you not to). Medicines ? · Take pain medicines exactly as directed. ¨ If the doctor gave you a prescription medicine for pain, take it as prescribed. ¨ If you are not taking a prescription pain medicine, ask your doctor if you can take an over-the-counter medicine. ? · If you think your pain medicine is making you sick to your stomach: 
¨ Take your medicine after meals (unless your doctor has told you not to). ¨ Ask your doctor for a different pain medicine. ? · If your doctor prescribed antibiotics, take them as directed. Do not stop taking them just because you feel better. You need to take the full course of antibiotics. Follow-up care is a key part of your treatment and safety. Be sure to make and go to all appointments, and call your doctor if you are having problems. It's also a good idea to know your test results and keep a list of the medicines you take. When should you call for help? Call 911 anytime you think you may need emergency care. For example, call if: 
? · You passed out (lost consciousness). ? · You have severe trouble breathing. ? · You have sudden chest pain and shortness of breath, or you cough up blood. ? · You have severe belly pain. ?Call your doctor now or seek immediate medical care if: 
? · You are sick to your stomach or cannot keep fluids down. ? · Your urine is still red or you see blood clots after you have urinated several times. ? · You have trouble passing urine or stool, especially if you have pain or swelling in your lower belly. ? · You have signs of a blood clot, such as: 
¨ Pain in your calf, back of the knee, thigh, or groin. ¨ Redness and swelling in your leg or groin. ? · You develop a fever or severe chills. ? · You have pain in your back just below your rib cage. This is called flank pain. ? Watch closely for changes in your health, and be sure to contact your doctor if: 
? · You have pain or burning when you urinate. A burning feeling is normal for a day or two after the test, but call if it does not get better. ? · You have a frequent urge to urinate but can pass only small amounts of urine. ? · Your urine is pink, red, or cloudy, or smells bad. It is normal for the urine to have a pinkish color for a few days after the test, but call if it does not get better. Where can you learn more? Go to http://sarah-hugh.info/. Enter I986 in the search box to learn more about \"Cystoscopy: What to Expect at Home. \" Current as of: May 12, 2017 Content Version: 11.4 © 2355-8373 [employer]. Care instructions adapted under license by "[employer] (which disclaims liability or warranty for this information).  If you have questions about a medical condition or this instruction, always ask your healthcare professional. [address] any warranty or liability for your use of this information. DISCHARGE SUMMARY from Nurse PATIENT INSTRUCTIONS: 
 
After general anesthesia or intravenous sedation, for 24 hours or while taking prescription Narcotics: · Limit your activities · Do not drive and operate hazardous machinery · Do not make important personal or business decisions · Do  not drink alcoholic beverages · If you have not urinated within 8 hours after discharge, please contact your surgeon on call. Report the following to your surgeon: 
· Excessive pain, swelling, redness or odor of or around the surgical area · Temperature over 100.5 · Nausea and vomiting lasting longer than 4 hours or if unable to take medications · Any signs of decreased circulation or nerve impairment to extremity: change in color, persistent  numbness, tingling, coldness or increase pain · Any questions What to do at Home: These are general instructions for a healthy lifestyle: No smoking/ No tobacco products/ Avoid exposure to second hand smoke Surgeon General's Warning:  Quitting smoking now greatly reduces serious risk to your health. Obesity, smoking, and sedentary lifestyle greatly increases your risk for illness A healthy diet, regular physical exercise & weight monitoring are important for maintaining a healthy lifestyle You may be retaining fluid if you have a history of heart failure or if you experience any of the following symptoms:  Weight gain of 3 pounds or more overnight or 5 pounds in a week, increased swelling in our hands or feet or shortness of breath while lying flat in bed. Please call your doctor as soon as you notice any of these symptoms; do not wait until your next office visit. Recognize signs and symptoms of STROKE: 
 
F-face looks uneven A-arms unable to move or move unevenly S-speech slurred or non-existent T-time-call 911 as soon as signs and symptoms begin-DO NOT go Back to bed or wait to see if you get better-TIME IS BRAIN. Warning Signs of HEART ATTACK Call 911 if you have these symptoms: 
? Chest discomfort. Most heart attacks involve discomfort in the center of the chest that lasts more than a few minutes, or that goes away and comes back. It can feel like uncomfortable pressure, squeezing, fullness, or pain. ? Discomfort in other areas of the upper body. Symptoms can include pain or discomfort in one or both arms, the back, neck, jaw, or stomach. ? Shortness of breath with or without chest discomfort. ? Other signs may include breaking out in a cold sweat, nausea, or lightheadedness. Don't wait more than five minutes to call [address]! Fast action can save your life. Calling 911 is almost always the fastest way to get lifesaving treatment. Emergency Medical Services staff can begin treatment when they arrive  up to an hour sooner than if someone gets to the hospital by car. The discharge information has been reviewed with the patient. The patient verbalized understanding. Discharge medications reviewed with the patient and appropriate educational materials and side effects teaching were provided. ___________________________________________________________________________________________________________________________________ Patient armband removed and given to patient to take home. Patient was informed of the privacy risks if armband lost or stolen Introducing [address]! [facility] introduces "[employer] patient portal. Now you can access parts of your medical record, email your doctor's office, and request medication refills online. 1. In your internet browser, go to [email or web address] 2. Click on the First Time User? Click Here link in the Sign In box. You will see the New Member Sign Up page. 3. Enter your "[employer] Access Code exactly as it appears below. You will not need to use this code after youve completed the sign-up process.  If you do not sign up before the expiration date, you must request a new code. · Met[employer] Access Code: [email or web address] Expires: 5/30/2018  2:34 PM 
 
4. Enter the last four digits of your Social Security Number (xxxx) and Date of Birth (mm/dd/yyyy) as indicated and click Submit. You will be taken to the next sign-up page. 5. Create a Metricly ID. This will be your [employer] login ID and cannot be changed, so think of one that is secure and easy to remember. 6. Create a [employer] password. You can change your password at any time. 7. Enter your Password Reset Question and Answer. This can be used at a later time if you forget your password. 8. Enter your e-mail address. You will receive e-mail notification when new information is available in [address] 9. Click Sign Up. You can now view and download portions of your medical record. 10. Click the Download Summary menu link to download a portable copy of your medical information. If you have questions, please visit the Frequently Asked Questions section of the [employer] website. Remember, [employer] is NOT to be used for urgent needs. For medical emergencies, dial 911. Now available from your iPhone and Android! Introducing Kale Hernandez As a [facility] patient, I wanted to make you aware of our electronic visit tool called Kale Hernandez. [facility] 24/7 allows you to connect within minutes with a medical provider 24 hours a day, seven days a week via a mobile device or tablet or logging into a secure website from your computer. You can access Kale Hernandez from anywhere in the [place].  
 
A virtual visit might be right for you when you have a simple condition and feel like you just dont want to get out of bed, or cant get away from work for an appointment, when your regular [facility] provider is not available (evenings, weekends or holidays), or when youre out of town and need minor care. Electronic visits cost only $49 and if the [facility] 24/7 provider determines a prescription is needed to treat your condition, one can be electronically transmitted to a nearby pharmacy*. Please take a moment to enroll today if you have not already done so. The enrollment process is free and takes just a few minutes. To enroll, please download the [employer]/Track[employer] mariola to your tablet or phone, or visit [email or web address] to enroll on your computer. And, as an [address] [place] [address] patient with a Needle account, the results of your visits will be scanned into your electronic medical record and your primary care provider will be able to view the scanned results. We urge you to continue to see your regular [facility] provider for your ongoing medical care. And while your primary care provider may not be the one available when you seek a [employer] virtual visit, the peace of mind you get from getting a real diagnosis real time can be priceless. For more information on [employer], view our Frequently Asked Questions (FAQs) at [email or web address]. Sincerely, 
 
Germain Rogers MD 
Chief Medical Officer [employer] *:  certain medications cannot be prescribed via [employer] Unresulted Labs-Please follow up with your PCP about these lab tests Order Current Status EKG, 12 LEAD, INITIAL Preliminary result Providers Seen During Your Hospitalization Provider Specialty Primary office phone Belén Selby MD Urology [phone] Your Primary Care Physician (PCP) Primary Care Physician Office Phone Office Fax [phone] [phone] You are allergic to the following No active allergies Recent Documentation Height Weight BMI Smoking Status 1.727 m 83.2 kg 27.87 kg/m2 Former Smoker Emergency Contacts Name Discharge Info Relation Home Work Mobile [facility] DISCHARGE CAREGIVER [3] Spouse [3]   [phone] Patient Belongings The following personal items are in your possession at time of discharge: 
  Dental Appliances: Uppers         Home Medications: None   Jewelry: None  Clothing: Pants, Shirt, Footwear, Undergarments    Other Valuables: None Please provide this summary of care documentation to your next provider. Signatures-by signing, you are acknowledging that this After Visit Summary has been reviewed with you and you have received a copy. Patient Signature:  ____________________________________________________________ Date:  ____________________________________________________________  
  
[facility] Provider Signature:  ____________________________________________________________ Date:  ____________________________________________________________

## 2018-04-04 NOTE — INTERVAL H&P NOTE
H&P Update:  Melissa Saldaña was seen and examined. History and physical has been reviewed. The patient has been examined. There have been no significant clinical changes since the completion of the originally dated History and Physical.  Patient identified by surgeon; surgical site was confirmed by patient and surgeon. He has been off Plavix since 3/4 for continued hematuria with stent in and stopped ASA on 3/27. All questions answered, ready to proceed with surgery.      Signed By: Anand Hernandez MD     April 4, 2018 12:08 PM

## 2018-04-04 NOTE — ANESTHESIA POSTPROCEDURE EVALUATION
Post-Anesthesia Evaluation and Assessment    Patient: Vinnie Bradshaw MRN: [id number]  SSN: [id number]    YOB: 1940  Age: 68 y.o. Sex: male       Cardiovascular Function/Vital Signs  Visit Vitals    /70 (BP 1 Location: Left arm, BP Patient Position: At rest)    Pulse 60    Temp 36.2 °C (97.1 °F)    Resp 16    Ht 5' 8\" (1.727 m)    Wt 83.2 kg (183 lb 5 oz)    SpO2 92%    BMI 27.87 kg/m2       Patient is status post general anesthesia for Procedure(s):  CYSTOSCOPY, LEFT URETEROSCOPY WITH BIOPSY, LASER ABLATION OF TUMOR LEFT URETERAL STENT EXCHANGE BILATERAL ,bladder biopsyRETROGRADE. Nausea/Vomiting: None    Postoperative hydration reviewed and adequate. Pain:  Pain Scale 1: Numeric (0 - 10) (04/04/18 1600)  Pain Intensity 1: 6 (04/04/18 1600)   Managed    Neurological Status:   Neuro (WDL): Within Defined Limits (04/04/18 1545)   At baseline    Mental Status and Level of Consciousness: Alert and oriented     Pulmonary Status:   O2 Device: Room air (04/04/18 1600)   Adequate oxygenation and airway patent    Complications related to anesthesia: None    Post-anesthesia assessment completed.  No concerns    Signed By: Tyrell Fontana MD     April 4, 2018

## 2018-04-04 NOTE — ANESTHESIA PREPROCEDURE EVALUATION
Anesthetic History   No history of anesthetic complications            Review of Systems / Medical History  Patient summary reviewed, nursing notes reviewed and pertinent labs reviewed    Pulmonary    COPD: moderate    Sleep apnea: No treatment  Shortness of breath and smoker      Comments: Pt has postnasal drip from allergies. Otherwise feeling fine. Recently quit smoking   Neuro/Psych       CVA: no residual symptoms       Cardiovascular    Hypertension: well controlled          CAD and cardiac stents         GI/Hepatic/Renal  Within defined limits              Endo/Other    Diabetes: well controlled, type 2    Cancer     Other Findings   Comments: Documentation of current medication  Current medications obtained, documented and obtained? YES      Risk Factors for Postoperative nausea/vomiting:       History of postoperative nausea/vomiting? NO       Female? YES       Motion sickness? NO       Intended opioid administration for postoperative analgesia? YES      Smoking Abstinence:  Current Smoker? NO  Elective Surgery? YES  Seen preoperatively by anesthesiologist or proxy prior to day of surgery? YES  Pt abstained from smoking 24 hours prior to anesthesia?  YES    Preventive care/screening for High Blood Pressure:  Aged 18 years and older: YES  Screened for high blood pressure: YES  Patients with high blood pressure referred to primary care provider   for BP management: YES               Physical Exam    Airway  Mallampati: III  TM Distance: 4 - 6 cm  Neck ROM: normal range of motion   Mouth opening: Normal     Cardiovascular  Regular rate and rhythm,  S1 and S2 normal,  no murmur, click, rub, or gallop  Rhythm: regular  Rate: normal         Dental    Dentition: Full upper dentures     Pulmonary  Breath sounds clear to auscultation               Abdominal  GI exam deferred       Other Findings            Anesthetic Plan    ASA: 3  Anesthesia type: general          Induction: Intravenous  Anesthetic plan and risks discussed with: Patient

## 2018-04-05 LAB
ATRIAL RATE: 90 BPM
CALCULATED R AXIS, ECG10: -14 DEGREES
CALCULATED T AXIS, ECG11: 161 DEGREES
DIAGNOSIS, 93000: NORMAL
Q-T INTERVAL, ECG07: 354 MS
QRS DURATION, ECG06: 88 MS
QTC CALCULATION (BEZET), ECG08: 483 MS
VENTRICULAR RATE, ECG03: 112 BPM

## 2018-04-05 NOTE — OP NOTES
[facility]  OPERATIVE REPORT    Gerald Carranza  MR#: [id number]  : 1940  ACCOUNT #: [de-identified]   DATE OF SERVICE: 2018    PREOPERATIVE DIAGNOSES:  1. Low grade left ureteral urothelial carcinoma. 2.  Low grade Ta bladder cancer. POSTOPERATIVE DIAGNOSES:  1. Low grade left ureteral urothelial carcinoma. 2.  Low grade Ta bladder cancer. PROCEDURES PERFORMED:  1. Cystoscopy. 2.  Bilateral retrograde pyelograms. 3.  Bladder biopsy. 4.  Left ureteroscopy with biopsies. 5.  Left ureteroscopy with laser ablation of large ureteral tumor. 6.  Left ureteral stent placement. 7.  Fluoroscopy time less than 30 minutes with interpretation. Please add a modifier 22 to this procedure as it took twice as long as a typical ureteroscopy given a very large ureteral tumor that was over 5 cm in length and essentially filled the entire lumen of the left proximal ureter which resulted in a very long ablation time. SURGEON:  Vi Landry MD    ASSISTANT:  Blaze Hilton  (tech). ESTIMATED BLOOD LOSS:  Minimal.    IV FLUIDS:  Crystalloid per anesthesia records. SPECIMENS REMOVED:  1. Left ureter urine for cytology. 2.  Left ureteral tumor biopsies. 3.  Bladder biopsy (dome). IMPLANTS/GRAFTS:  Left 26 cm x 6-Guinean double-J ureteral stent. DRAINS:  A 16-Guinean Chávez catheter. COMPLICATIONS:  None. CONDITION:  Stable. ANESTHESIA:  general    INDICATION FOR PROCEDURE:  The patient is a 66-year-old  male who has a very complicated medical history. Most of his medical issues are cardiac or cardiovascular in nature. He has a history of carotid artery disease and has had stenting of the carotids. He has also had an AAA with an aortic graft repair and he also has cardiac disease with a history of a pacemaker. He normally takes Plavix and aspirin.   He was referred to me by Dr. Christelle Acosta, a urologist in [facility] for management of a large upper tract urothelial carcinoma as well as bladder cancer. He had undergone a CT urogram back at the end of 11/2017. He subsequently underwent a cystoscopy and bladder biopsies. Those were done at the beginning of 2018. The bladder biopsies came back as low-grade bladder cancer that was Ta stage. Retrogrades were performed which showed a large filling defect in the left ureter and for whatever reason ureteroscopy was not performed at that time and he was brought back at a later date and left ureteroscopy was performed. Biopsies were taken. This was also read by a pathologist at [facility].  That showed low-grade urothelial carcinoma in the ureter, so he was then referred to me for management of low-grade bladder and upper tract urothelial carcinoma. He had a preexisting left ureteral stent in place. He was having continued hematuria with the stent, so he stopped the Plavix on his own. He was instructed to continue on a baby aspirin; however, he stopped this preoperatively as well. He had a urine cytology from the office which was atypical.  He was consented for cystoscopy, retrogrades, repeat ureteroscopy, biopsies and possible tumor ablation depending on the size of the tumor. We also will look in his bladder and take bladder biopsies or perform a TURBT if there is any significant residual bladder cancer. Risks, benefits and alternatives were fully discussed with the patient and he agreed and consented to proceed. DESCRIPTION OF PROCEDURE:  The patient was taken to the operating room. Preoperative time-out was performed identifying the correct patient and procedure to be done. He was prepped and draped in sterile fashion and placed in a dorsal lithotomy position. He received a dose of IV antibiotics prior to procedure start time. He had SCDs placed prior to the induction of anesthesia. A 21-Indonesian rigid cystoscope was placed in the urethra.   The urethra was normal.  The prostate was nonobstructing. He did have somewhat of a high bladder neck. The preexisting left ureteral stent was seen emanating from the left ureteral orifice. There was some edema around the left ureteral orifice. The bladder was examined with a 30 as well as a 70-degree lens. He had moderate trabeculation of the bladder. The right ureteral orifice was also in the normal orthotopic position. There was just a slight erythematous raised area near the dome of the bladder that looked to be at a prior biopsy or resection site back a few months ago. There were no overt bladder tumors seen. The erythematous area was biopsied at the end of the case using an alligator biopsy forceps and I used a Bugbee to cauterize the biopsy site for hemostasis. We did perform a retrograde pyelogram on the right side. The right ureter collecting system was normal.  There is no hydronephrosis, no filling defects and no extravasation of contrast.  We then removed the left ureteral stent out the urethral meatus and a Sensor wire was passed through the stent into the kidney. A dual lumen catheter was then used to perform a left retrograde pyelogram.  This showed a somewhat dilated mid and proximal ureter. There were numerous filling defects seen in the proximal ureter.  films revealed the aortic graft and what appeared to be a left common iliac graft that was fairly sizable. Contrast was seen in the renal pelvis and the calices. There was moderate hydronephrosis. There were no filling defects seen in the kidney itself. At this point, a second Sensor wire was placed. I did use the ureteral access sheath; however, had difficulty passing this above the mid ureter likely where his graft was. I did not force the access sheath. We just left it at that position with a slight amount hanging out the urethral meatus. The flexible ureteroscope was then passed through the access sheath up into the mid ureter.   There was some reaction in the mid ureter, likely at the site of his graft. We then advanced the ureteroscope into the proximal ureter. We saw a large pedunculated tumor that filled nearly the entire lumen of the ureter. Pictures were taken of the tumor. We then advanced the scope beyond the tumor, which we were able to get the scope easily past it. The tumor length measured about 5 cm. We then examined the left kidney, the renal pelvis and all the calices. There was some slight edema in the renal pelvis, likely from his stent. There were no tumors seen in the renal pelvis or the calices. At this point, the ureteroscope was backed down at the level of the tumor. A urine cytology washing was taken here using some saline. I then advanced the Piranha biopsy forceps and we took several biopsies of the large tumor and these were sent off for permanent specimen. At this point, once I thought we had adequate tissue, I then used a Diode alternating with the holmium laser to ablate the tumor. Again, it took over 90 minutes to try to ablate as much of the tumor as possible. We used the Diode laser at a setting of 12 and then 15 shnae on a continuous pulse to ablate the tumor. This was a very hemostatic ablation. We alternated with the holmium current to cut the ablated tumor off. I probably debulked somewhere between 80-90% of the tumor. At no point during the ablation was there any evidence for perforation. I also used a 1.9-Wolof zero tip basket to basket some of the tumor fragments off the ureteral wall as they had been loosened. These were sent off with the   biopsy specimen. Additional pictures were taken post-ablation to show to the family. A retrograde pyelogram was then performed. There was no extravasation of contrast.  You could still see a smaller filling defect in the proximal ureter. I then backed the flexible ureteroscope down the entire length of the ureter.   No additional tumors were seen in the mid or distal ureter. We then had our safety wire still in place. I then backloaded the rigid cystoscope over the wire and a 26 cm x 6-Kosovan double-J stent was passed into the kidney. I then advanced the stent until the distal aspect was at the bladder neck. The wire was removed. The stent was deployed. Final fluoroscopic imaging confirmed good positioning of the stent with the proximal portion in the kidney and the distal curl was seen in the bladder. The bladder was then drained via the cystoscope sheath. Given the duration of the procedure under anesthesia and apparently the patient also had prior issues of postop urinary retention, I did elect to leave a 16-Kosovan Chávez catheter. This was placed under sterile condition and the balloon was filled with 10 mL of sterile water and connected to gravity drainage. The urine was fairly jannie at the end of the procedure. Again, the tumor ablation site at the end of the procedure was completely hemostatic with no bleeding. At this point, the procedure was completed. The patient tolerated procedure well and there were no immediate complications. He was transferred to the recovery room in stable condition. PLAN:  We will follow up on his pathology results. If the repeat biopsies and cytology all are consistent with low grade disease, I think it would be reasonable to repeat an ablation on him in about four weeks to allow for ureteral healing. Although endoscopic management may be somewhat difficult for him, he is at an extremely high risk for a major operation. However, if his biopsies come back as high grade, then he will be referred for a nephroureterectomy with one of my partners. Daphne Milton.  MD Arianna Harrison / [place]  D: 04/04/2018 17:35     T: 04/04/2018 20:45  JOB #: [id number]

## 2018-04-09 NOTE — PROGRESS NOTES
Called pt and discussed path results. Getting 2nd opinion from [facility].       Kasandra Flower MD  , Dept of Urology  [facility]  [facility] [place] [place]  Pager #: [phone]

## 2018-10-18 PROBLEM — C67.9 MALIGNANT NEOPLASM OF URINARY BLADDER (HCC): Status: ACTIVE | Noted: 2018-01-10

## 2018-10-19 PROBLEM — C66.2 URETERAL CANCER, LEFT (HCC): Status: ACTIVE | Noted: 2018-10-19

## 2019-02-21 PROBLEM — E83.51 HYPOCALCEMIA: Status: ACTIVE | Noted: 2018-12-26

## 2019-02-21 PROBLEM — I50.9 ACUTE ON CHRONIC HEART FAILURE (HCC): Status: ACTIVE | Noted: 2018-12-26

## 2019-02-21 PROBLEM — R06.02 SHORTNESS OF BREATH: Status: ACTIVE | Noted: 2018-12-26

## 2019-02-21 PROBLEM — E83.42 HYPOMAGNESEMIA: Status: ACTIVE | Noted: 2018-12-26

## 2019-03-21 PROBLEM — C64.9 UROTHELIAL CARCINOMA OF KIDNEY (HCC): Status: ACTIVE | Noted: 2019-02-25

## 2019-03-21 PROBLEM — I48.0 PAF (PAROXYSMAL ATRIAL FIBRILLATION) (HCC): Status: ACTIVE | Noted: 2019-03-01

## 2019-09-18 PROBLEM — R33.9 RETENTION OF URINE: Status: ACTIVE | Noted: 2019-04-11

## 2019-09-18 PROBLEM — N18.4 STAGE 4 CHRONIC KIDNEY DISEASE (HCC): Status: ACTIVE | Noted: 2019-04-11

## 2019-09-18 PROBLEM — J18.9 PNEUMONIA OF BOTH UPPER LOBES: Status: ACTIVE | Noted: 2019-04-11

## 2019-09-18 PROBLEM — I95.9 HYPOTENSION: Status: ACTIVE | Noted: 2019-04-04

## 2019-09-18 PROBLEM — R78.81 BACTEREMIA DUE TO GRAM-POSITIVE BACTERIA: Status: ACTIVE | Noted: 2019-04-11

## 2019-09-18 PROBLEM — N17.9 ACUTE KIDNEY INJURY (NONTRAUMATIC) (HCC): Status: ACTIVE | Noted: 2019-04-04

## 2019-09-18 PROBLEM — Z90.5 H/O UNILATERAL NEPHRECTOMY: Status: ACTIVE | Noted: 2019-04-04

## 2019-09-18 PROBLEM — R65.10 SIRS (SYSTEMIC INFLAMMATORY RESPONSE SYNDROME) (HCC): Status: ACTIVE | Noted: 2019-04-04

## 2019-09-18 PROBLEM — R91.1 SOLITARY PULMONARY NODULE: Status: ACTIVE | Noted: 2019-04-11

## 2019-09-18 PROBLEM — E86.0 DEHYDRATION: Status: ACTIVE | Noted: 2019-04-04

## 2020-09-16 PROBLEM — Z86.010 HISTORY OF COLON POLYPS: Status: ACTIVE | Noted: 2020-09-16

## 2023-06-28 ENCOUNTER — NEW PATIENT (OUTPATIENT)
Dept: RURAL CLINIC 1 | Facility: CLINIC | Age: 83
End: 2023-06-28

## 2023-06-28 DIAGNOSIS — H01.013: ICD-10-CM

## 2023-06-28 DIAGNOSIS — H01.016: ICD-10-CM

## 2023-06-28 PROCEDURE — 99203 OFFICE O/P NEW LOW 30 MIN: CPT

## 2023-06-28 ASSESSMENT — TONOMETRY
OD_IOP_MMHG: 14
OS_IOP_MMHG: 15

## 2023-06-28 ASSESSMENT — VISUAL ACUITY
OS_SC: 20/25
OD_SC: 20/20

## 2023-10-24 ENCOUNTER — ESTABLISHED PATIENT (OUTPATIENT)
Dept: RURAL CLINIC 1 | Facility: CLINIC | Age: 83
End: 2023-10-24

## 2023-10-24 DIAGNOSIS — Z96.1: ICD-10-CM

## 2023-10-24 DIAGNOSIS — H01.016: ICD-10-CM

## 2023-10-24 DIAGNOSIS — H01.013: ICD-10-CM

## 2023-10-24 DIAGNOSIS — E11.9: ICD-10-CM

## 2023-10-24 PROCEDURE — 92014 COMPRE OPH EXAM EST PT 1/>: CPT

## 2023-10-24 ASSESSMENT — VISUAL ACUITY
OD_SC: 20/25-2
OS_SC: 20/25-1

## 2023-10-24 ASSESSMENT — TONOMETRY
OD_IOP_MMHG: 11
OS_IOP_MMHG: 11

## 2024-02-14 ENCOUNTER — EMERGENCY VISIT (OUTPATIENT)
Dept: RURAL CLINIC 1 | Facility: CLINIC | Age: 84
End: 2024-02-14

## 2024-02-14 DIAGNOSIS — H01.01B: ICD-10-CM

## 2024-02-14 DIAGNOSIS — Z96.1: ICD-10-CM

## 2024-02-14 DIAGNOSIS — H01.01A: ICD-10-CM

## 2024-02-14 PROCEDURE — 99213 OFFICE O/P EST LOW 20 MIN: CPT

## 2024-02-14 ASSESSMENT — VISUAL ACUITY
OS_SC: 20/25-2
OD_SC: 20/30

## 2024-07-09 ENCOUNTER — EMERGENCY VISIT (OUTPATIENT)
Dept: RURAL CLINIC 1 | Facility: CLINIC | Age: 84
End: 2024-07-09

## 2024-07-09 DIAGNOSIS — H11.31: ICD-10-CM

## 2024-07-09 DIAGNOSIS — H01.01B: ICD-10-CM

## 2024-07-09 DIAGNOSIS — Z96.1: ICD-10-CM

## 2024-07-09 DIAGNOSIS — H01.01A: ICD-10-CM

## 2024-07-09 PROCEDURE — 99213 OFFICE O/P EST LOW 20 MIN: CPT

## 2024-07-09 ASSESSMENT — VISUAL ACUITY
OS_SC: 20/25
OD_SC: 20/30

## 2024-11-08 ENCOUNTER — COMPREHENSIVE EXAM (OUTPATIENT)
Dept: RURAL CLINIC 1 | Facility: CLINIC | Age: 84
End: 2024-11-08

## 2024-11-08 DIAGNOSIS — H01.01A: ICD-10-CM

## 2024-11-08 DIAGNOSIS — Z96.1: ICD-10-CM

## 2024-11-08 DIAGNOSIS — E11.9: ICD-10-CM

## 2024-11-08 DIAGNOSIS — H01.01B: ICD-10-CM

## 2024-11-08 PROCEDURE — 92014 COMPRE OPH EXAM EST PT 1/>: CPT

## (undated) DEVICE — NITINOL STONE RETRIEVAL BASKET: Brand: ZERO TIP

## (undated) DEVICE — SOLUTION IRRIG 1000ML H2O STRL BLT

## (undated) DEVICE — DUAL LUMEN URETERAL CATHETER

## (undated) DEVICE — FLEXOR, URETERAL ACCESS SHEATH WITH AQ, HYDROPHILIC COATING: Brand: FLEXOR

## (undated) DEVICE — CONTAINER DRN 20L DISP FLUIDRN LLS

## (undated) DEVICE — SPONGE GZ W4XL4IN COT 12 PLY TYP VII WVN C FLD DSGN

## (undated) DEVICE — Device

## (undated) DEVICE — URETERAL ACCESS SHEATH SET: Brand: NAVIGATOR

## (undated) DEVICE — SNAP KOVER: Brand: UNBRANDED

## (undated) DEVICE — GDWIRE 3CM FLX-TIP 0.038X150CM -- BX/5 SENSOR

## (undated) DEVICE — SOLUTION IRRIG 3000ML 0.9% SOD CHL FLX CONT 0797208] ICU MEDICAL INC]

## (undated) DEVICE — URETEROSCOPIC BIOPSY FORCEPS: Brand: PIRANHA

## (undated) DEVICE — IRRIGATION KT PMP SGL ACT SAPS -- BX/5

## (undated) DEVICE — TRAY URIN CATH PED 16FR BLLN 5CC INDWL STR TIP INF CTRL

## (undated) DEVICE — SEAL INSTR CYSTO ADJ BX PRT SEAL FOR ACC UP TO 6FR

## (undated) DEVICE — GDWIRE URET STR STD .038X150 -- ZIPWIRE STD

## (undated) DEVICE — STERILE LATEX POWDER-FREE SURGICAL GLOVESWITH NITRILE COATING: Brand: PROTEXIS

## (undated) DEVICE — OPEN-END FLEXI-TIP URETERAL CATHETER: Brand: FLEXI-TIP

## (undated) DEVICE — KENDALL SCD EXPRESS SLEEVES, KNEE LENGTH, MEDIUM: Brand: KENDALL SCD

## (undated) DEVICE — TRAY PREP DRY W/ PREM GLV 2 APPL 6 SPNG 2 UNDPD 1 OVERWRAP